# Patient Record
Sex: FEMALE | Race: WHITE | Employment: UNEMPLOYED | URBAN - METROPOLITAN AREA
[De-identification: names, ages, dates, MRNs, and addresses within clinical notes are randomized per-mention and may not be internally consistent; named-entity substitution may affect disease eponyms.]

---

## 2017-05-02 ENCOUNTER — HOSPITAL ENCOUNTER (OUTPATIENT)
Dept: MRI IMAGING | Age: 57
Discharge: HOME | End: 2017-05-02
Attending: ORTHOPAEDIC SURGERY
Payer: MEDICARE

## 2017-05-02 DIAGNOSIS — M40.209 ACQUIRED KYPHOSIS: ICD-10-CM

## 2017-05-02 DIAGNOSIS — M54.12 CERVICAL RADICULOPATHY: ICD-10-CM

## 2017-05-02 PROCEDURE — 72141 MRI NECK SPINE W/O DYE: CPT

## 2017-11-02 ENCOUNTER — HOSPITAL ENCOUNTER (OUTPATIENT)
Dept: MRI IMAGING | Age: 57
Discharge: HOME | End: 2017-11-02
Attending: PHYSICAL MEDICINE & REHABILITATION
Payer: MEDICARE

## 2017-11-02 DIAGNOSIS — M51.26 OTHER INTERVERTEBRAL DISC DISPLACEMENT, LUMBAR REGION: ICD-10-CM

## 2017-11-02 PROCEDURE — 72148 MRI LUMBAR SPINE W/O DYE: CPT

## 2018-02-16 ENCOUNTER — HOSPITAL ENCOUNTER (EMERGENCY)
Age: 58
Discharge: HOME | End: 2018-02-16
Attending: EMERGENCY MEDICINE
Payer: MEDICARE

## 2018-02-16 VITALS
DIASTOLIC BLOOD PRESSURE: 79 MMHG | TEMPERATURE: 97.6 F | HEART RATE: 90 BPM | WEIGHT: 160.94 LBS | HEIGHT: 64 IN | RESPIRATION RATE: 18 BRPM | SYSTOLIC BLOOD PRESSURE: 119 MMHG | BODY MASS INDEX: 27.48 KG/M2 | OXYGEN SATURATION: 98 %

## 2018-02-16 DIAGNOSIS — T14.8XXA ANIMAL BITE: Primary | ICD-10-CM
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
## 2018-02-16 DIAGNOSIS — Z23 NEED FOR IMMUNIZATION AGAINST RABIES: ICD-10-CM

## 2018-02-16 PROCEDURE — 90375 RABIES IG IM/SC: CPT | Performed by: PHYSICIAN ASSISTANT

## 2018-02-16 PROCEDURE — 99282 EMERGENCY DEPT VISIT SF MDM: CPT

## 2018-02-16 PROCEDURE — 90675 RABIES VACCINE IM: CPT | Performed by: PHYSICIAN ASSISTANT

## 2018-02-16 PROCEDURE — 90471 IMMUNIZATION ADMIN: CPT

## 2018-02-16 PROCEDURE — 96372 THER/PROPH/DIAG INJ SC/IM: CPT

## 2018-02-16 PROCEDURE — 74011250636 HC RX REV CODE- 250/636: Performed by: PHYSICIAN ASSISTANT

## 2018-02-16 RX ORDER — AMOXICILLIN AND CLAVULANATE POTASSIUM 875; 125 MG/1; MG/1
1 TABLET, FILM COATED ORAL 2 TIMES DAILY
Qty: 14 TAB | Refills: 0 | Status: SHIPPED | OUTPATIENT
Start: 2018-02-16 | End: 2018-03-06 | Stop reason: ALTCHOICE

## 2018-02-16 RX ADMIN — RABIES IMMUNE GLOBULIN (HUMAN) 1500 UNITS: 150 INJECTION INTRAMUSCULAR at 14:22

## 2018-02-16 RX ADMIN — RABIES VACCINE 2.5 UNITS: KIT at 14:22

## 2018-02-16 NOTE — ED PROVIDER NOTES
EMERGENCY DEPARTMENT HISTORY AND PHYSICAL EXAM      Date: 2/16/2018  Patient Name: Leeann Zelaya    History of Presenting Illness     Chief Complaint   Patient presents with    Animal Bite     bit by a stray cat right forearm last night     History Provided By: Patient    HPI: Leeann Zelaya, 65 y.o. female with PMHx significant for HTN, DM, arrhythmia, arthritis, and anxiety, presents ambulatory to the ED with concern following a sudden onset of an animal bite last night. Per pt, she was bitten by a stray feral cat around 19:30 last night. Pt informs the cat came up to her and was petting her when the cat bit her to the right forearm. Pt informs there is a mild discomfort to the region with an associated sore, aching sensation. Pt states she has cleaned the region with soap, water, and hydroxide. Pt reports the cat has been there in the neighborhood recurrently and she pets the cat often. Pt reports she called her PCP's office where she was advised to visit the ED for a rabies vaccine. Pt states since the cat is a stray cat, she is unsure of the animal's immunization condition. Pt expresses concern for the vaccine and potential infection. Pt reports no animal control intervention. Pt reports no other symptoms or modifying factors. Pt specifically denies any nausea, vomiting, fevers, chills, headaches, chest pain, or SOB. PMHx: DDD, kidney stones, and SVT  PSHx: cholecystectomy, tubal ligation, hysterectomy   Social Hx: + EtOH; - Smoker; - Illicit Drugs    PCP: Chapincito Kern MD    There are no other complaints, changes, or physical findings at this time. Current Outpatient Prescriptions   Medication Sig Dispense Refill    amoxicillin-clavulanate (AUGMENTIN) 875-125 mg per tablet Take 1 Tab by mouth two (2) times a day. 14 Tab 0    HYDROCODONE/IBUPROFEN (VICOPROFEN PO) Take 1 Tab by mouth as needed.       cyclobenzaprine (FLEXERIL) 10 mg tablet Take 10 mg by mouth three (3) times daily as needed for Muscle Spasm(s).  MECLIZINE HCL (MECLIZINE PO) Take  by mouth as needed.  promethazine (PHENERGAN) 25 mg tablet Take 25 mg by mouth every six (6) hours as needed for Nausea.  VITAMIN E ACETATE (VITAMIN E PO) Take 1 Tab by mouth daily.  ibuprofen (MOTRIN) 200 mg tablet Take 800 mg by mouth as needed for Pain.  7-OXODEHYDROEPIANDROSTERONE (7-KETO DHEA) 1 Tab by Does Not Apply route every morning.  metFORMIN (GLUCOPHAGE) 1,000 mg tablet Take 1,000 mg by mouth two (2) times daily (with meals).  BIEST / PROGESTERONE Apply 1 Tab to affected area daily. BiEst 1.5 mg / Progesterone 300 mg      levothyroxine (LEVOTHROID) 75 mcg tablet Take 75 mcg by mouth Daily (before breakfast).  PRASTERONE, DHEA, (DHEA PO) Take 5 mg by mouth daily.  magnesium oxide (MAG-OX) 400 mg tablet Take 400 mg by mouth daily.  cholecalciferol, vitamin D3, (VITAMIN D3) 2,000 unit tab Take 1 Tab by mouth daily.  ALPRAZolam (XANAX) 0.5 mg tablet Take 0.5 mg by mouth as needed for Anxiety.  TESTOSTERONE CREAM Apply  to affected area. Testosterone Cream       thyroid, Pork, (ARMOUR THYROID) 60 mg tablet Take 60 mg by mouth daily.  Indications: HYPOTHYROIDISM       Past History     Past Medical History:  Past Medical History:   Diagnosis Date    Anxiety     Arrhythmia     Arthritis     Bulging discs     in neck    Cataracts, bilateral     DDD (degenerative disc disease)     Diabetes ([address]     Endocrine disease     hypothyroid    Fainting     Gastroparesis     GERD (gastroesophageal reflux disease)     Hypertension     not treated with medication    Migraines     Nausea & vomiting     Other ill-defined conditions     Kidney stones    Other ill-defined conditions     fibromyalgia    SVT (supraventricular tachycardia) ([address]     Thyroid disease     Vasovagal syncope      Past Surgical History:  Past Surgical History:   Procedure Laterality Date    BREAST SURGERY PROCEDURE UNLISTED      breast reduction    CARDIAC SURG PROCEDURE UNLIST  2006    ablation    HX CHOLECYSTECTOMY      HX HYSTERECTOMY      HX SVT ABLATION  2006    [facility]    HX TUBAL LIGATION      HX UROLOGICAL      kidney stone removed.  HX UROLOGICAL      urethral surgery  x2     Family History:  Family History   Problem Relation Age of Onset   Yesy Juarez Cancer Mother      ovarian    Cancer Father      lung    Other Father      hepatitis b    Heart Disease Father     Lung Disease Father     Stroke Father     Thyroid Disease Sister      hypothyroid    Hypertension Sister     Anesth Problems Neg Hx     Thyroid Disease Sister      hypothyroid    Other Sister      benign brain tumor     Social History:  Social History   Substance Use Topics    Smoking status: Former Smoker     Years: 4.00     Quit date: 2/17/1984    Smokeless tobacco: Never Used    Alcohol use Yes      Comment: wine rare     Allergies: Allergies   Allergen Reactions    Sulfa (Sulfonamide Antibiotics) Rash    Tape [Adhesive] Rash     Review of Systems   Review of Systems   Constitutional: Negative for chills and fever. HENT: Negative for sore throat. Eyes: Negative for pain. Respiratory: Negative for cough and shortness of breath. Cardiovascular: Negative for chest pain. Gastrointestinal: Negative for abdominal pain, diarrhea, nausea and vomiting. Genitourinary: Negative for dysuria and hematuria. Musculoskeletal: Negative for arthralgias and myalgias. Skin: Positive for wound (+ right forearm ). Negative for rash. Neurological: Negative for dizziness, light-headedness, numbness and headaches. Psychiatric/Behavioral: Negative for behavioral problems and confusion. Physical Exam   Physical Exam   Constitutional: She is oriented to person, place, and time. She appears well-developed and well-nourished. No distress. HENT:   Head: Normocephalic and atraumatic.    Right Ear: External ear normal.   Left Ear: External ear normal.   Nose: Nose normal.   Eyes: Conjunctivae and EOM are normal.   Neck: Normal range of motion. Neck supple. Cardiovascular: Normal rate, regular rhythm and normal heart sounds. No murmur heard. Pulmonary/Chest: Effort normal and breath sounds normal. She has no decreased breath sounds. She has no wheezes. Abdominal: Soft. Bowel sounds are normal. She exhibits no distension. There is no tenderness. There is no guarding. Musculoskeletal: Normal range of motion. She exhibits no edema or tenderness. Neurological: She is alert and oriented to person, place, and time. Skin: Skin is warm and dry. No rash noted. She is not diaphoretic. Right anterior forearm with 2 by 2 inch area of ecchymosis with five puncture wounds; small amount of surrounding erythema with TTP; no fluctuance or induration, full ROM, 5/5  strength, 2+ radial pulse, no signs of symptoms of cellulitis    Psychiatric: She has a normal mood and affect. Her behavior is normal. Judgment normal.   Nursing note and vitals reviewed. Medical Decision Making   I am the first provider for this patient. I reviewed the vital signs, available nursing notes, past medical history, past surgical history, family history and social history. Vital Signs-Reviewed the patient's vital signs. Patient Vitals for the past 12 hrs:   Temp Pulse Resp BP SpO2   02/16/18 1209 97.6 °F (36.4 °C) 90 18 119/79 98 %     Pulse Oximetry Analysis - 98% on RA    Cardiac Monitor:   Rate: 90 bpm  Rhythm: Normal Sinus Rhythm      Records Reviewed: Nursing Notes and Old Medical Records    Provider Notes (Medical Decision Making):   DDx: animal bite, cellulitis, abscess, need for rabies prophylaxis     Pt presents s/p cat bite and scratch last night; cat is not contained with no animal control involvement. Pt unsure of cat's rabies status and would like to be prophylactically treated. ED Course:   Initial assessment performed.  The patients presenting problems have been discussed, and they are in agreement with the care plan formulated and outlined with them. I have encouraged them to ask questions as they arise throughout their visit. Procedure Note - Rabies IgG injection:    2:26 PM  Performed by Tiarra Mendoza PA-C. A local injection was performed on the R forearm. Area was prepped with an alcohol prep pad. 3 ccs of Rabies Immune Globulin was injected. Total time at bedside, performing this procedure was 5 minutes. The procedure was tolerated well without any complications. An ace wrap was applied to the area by the nurse directly after the procedure. This note is prepared by Emma Long, acting as Scribe for Jules Díaz PA-C. Disposition:  DISCHARGE NOTE:    2:25 PM  The patient is ready for discharge. The patient signs, symptoms, diagnosis, and discharge instructions have been discussed and the patient has conveyed their understanding. The patient is to follow-up as reccommended or returned to the ER should their symptoms worsen. Plan has been discussed and patient is in agreement. PLAN:  1. Discharge home  2. Medications as directed  3. Return to ED in 3 days for second rabies vaccination  4. Return precautions reviewed    Discharge Medication List as of 2/16/2018  2:34 PM      START taking these medications    Details   amoxicillin-clavulanate (AUGMENTIN) 875-125 mg per tablet Take 1 Tab by mouth two (2) times a day., Normal, Disp-14 Tab, R-0         CONTINUE these medications which have NOT CHANGED    Details   HYDROCODONE/IBUPROFEN (VICOPROFEN PO) Take 1 Tab by mouth as needed., Historical Med      cyclobenzaprine (FLEXERIL) 10 mg tablet Take 10 mg by mouth three (3) times daily as needed for Muscle Spasm(s). , Historical Med      MECLIZINE HCL (MECLIZINE PO) Take  by mouth as needed., Historical Med      promethazine (PHENERGAN) 25 mg tablet Take 25 mg by mouth every six (6) hours as needed for Nausea., Historical Med      VITAMIN E ACETATE (VITAMIN E PO) Take 1 Tab by mouth daily. , Historical Med      ibuprofen (MOTRIN) 200 mg tablet Take 800 mg by mouth as needed for Pain., Historical Med      7-OXODEHYDROEPIANDROSTERONE (7-KETO DHEA) 1 Tab by Does Not Apply route every morning., Historical Med      metFORMIN (GLUCOPHAGE) 1,000 mg tablet Take 1,000 mg by mouth two (2) times daily (with meals). , Historical Med      BIEST / PROGESTERONE Apply 1 Tab to affected area daily. BiEst 1.5 mg / Progesterone 300 mg, Historical Med      levothyroxine (LEVOTHROID) 75 mcg tablet Take 75 mcg by mouth Daily (before breakfast). , Historical Med      PRASTERONE, DHEA, (DHEA PO) Take 5 mg by mouth daily. , Historical Med      magnesium oxide (MAG-OX) 400 mg tablet Take 400 mg by mouth daily. , Historical Med      cholecalciferol, vitamin D3, (VITAMIN D3) 2,000 unit tab Take 1 Tab by mouth daily. , Historical Med      ALPRAZolam (XANAX) 0.5 mg tablet Take 0.5 mg by mouth as needed for Anxiety. , Historical Med      TESTOSTERONE CREAM Apply  to affected area. Testosterone Cream TESTOSTERONE CREAM [facility]:[id number] Historical Med      thyroid, Pork, (ARMOUR THYROID) 60 mg tablet Take 60 mg by mouth daily. Indications: HYPOTHYROIDISM, Historical Med           2. Follow-up Information     Follow up With Details Comments Contact Info    [facility] EMERGENCY DEPT  For scheduled rabies f/u injections [address] [place] [address]  [phone]        Return to ED if worse     Diagnosis     Clinical Impression:   1. Animal bite    2. Cat scratch    3. Need for immunization against rabies      Attestations: This note is prepared by Fran Izaguirre, acting as Scribe for Keren Alonso PA-C. Keren Alonso PA-C: The scribe's documentation has been prepared under my direction and personally reviewed by me in its entirety. I confirm that the note above accurately reflects all work, treatment, procedures, and medical decision making performed by me.

## 2018-02-16 NOTE — DISCHARGE INSTRUCTIONS
Thank you for allowing us to provide you with excellent care today. We hope we addressed all of your concerns and needs. We strive to provide excellent quality care in the Emergency Department. Please rate us as excellent, as anything less than excellent does not meet our expectations. If you feel that you have not received excellent quality care or timely care, please ask to speak to the nurse manager. Please choose us in the future for your continued health care needs. The exam and treatment you received in the Emergency Department were for an urgent problem and are not intended as complete care. It is important that you follow-up with a doctor, nurse practitioner, or physician assistant to:  (1) confirm your diagnosis,  (2) re-evaluation of changes in your illness and treatment, and  (3) for ongoing care. If your symptoms become worse or you do not improve as expected and you are unable to reach your usual health care provider, you should return to the Emergency Department. We are available 24 hours a day. Take this sheet with you when you go to your follow-up visit. If you have any problem arranging the follow-up visit, contact [address] [place] [address] 21 [phone])    Make an appointment with your Primary Care doctor for follow up of this visit. Return to the ER if you are unable to be seen in the time recommended on your discharge instructions. Rabies Vaccine: What You Need to Know  What is rabies? Rabies is a serious disease. It is caused by a virus. Rabies is mainly a disease of animals. Humans get rabies when they are bitten by infected animals. At first there might not be any symptoms. But weeks, or even months after a bite, rabies can cause pain, fatigue, headaches, fever, and irritability. These are followed by seizures, hallucinations, and paralysis. Human rabies is almost always fatal.  Wild animals-especially bats-are the most common source of human rabies infection in the [place].   Skunks, raccoons, dogs, cats, coyotes, foxes and other mammals can also transmit the disease. Human rabies is rare in the [place]. There have been only 54 cases diagnosed since 1990. However, between 16,000 and 39,000 people are vaccinated each year as a precaution after animal bites. Also, rabies is far more common in other parts of the world, with about 40,000 -70,000 rabies-related deaths worldwide each year. Bites from unvaccinated dogs cause most of these cases. Rabies vaccine can prevent rabies. Rabies vaccine  Rabies vaccine is given to people at high risk of rabies to protect them if they are exposed. It can also prevent the disease if it is given to a person after they have been exposed. Rabies vaccine is made from killed rabies virus. It cannot cause rabies. Who should get rabies vaccine and when? Preventive vaccination (no exposure)  · People at high risk of exposure to rabies, such as veterinarians, animal handlers, rabies laboratory workers, spelunkers, and rabies biologics production workers should be offered rabies vaccine. · The vaccine should also be considered for:  ¨ People whose activities bring them into frequent contact with rabies virus or with possibly rabid animals. ¨ International travelers who are likely to come in contact with animals in parts of the world where rabies is common. · The pre-exposure schedule for rabies vaccination is 3 doses, given at the following times:  ¨ Dose 1: As appropriate  ¨ Dose 2: 7 days after Dose 1  ¨ Dose 3: 21 days or 28 days after Dose 1  For laboratory workers and others who may be repeatedly exposed to rabies virus, periodic testing for immunity is recommended, and booster doses should be given as needed. (Testing or booster doses are not recommended for travelers.) Ask your doctor for details.   Vaccination After an Exposure  Anyone who has been bitten by an animal, or who otherwise may have been exposed to rabies, should clean the wound and see a doctor immediately. The doctor will determine if they need to be vaccinated. A person who is exposed and has never been vaccinated against rabies should get 4 doses of rabies vaccine-one dose right away, and additional doses on the 3rd, 7th, and 14th days. They should also get another shot called Rabies Immune Globulin at the same time as the first dose. A person who has been previously vaccinated should get 2 doses of rabies vaccine-one right away and another on the 3rd day. Rabies Immune Globulin is not needed. Tell your doctor if . . .  Talk with a doctor before getting rabies vaccine if you:  1. Ever had a serious (life-threatening) allergic reaction to a previous dose of rabies vaccine, or to any component of the vaccine; tell your doctor if you have any severe allergies. 2. Have a weakened immune system because of:  ¨ HIV/AIDS or another disease that affects the immune system. ¨ Treatment with drugs that affect the immune system, such as steroids. ¨ Cancer, or cancer treatment with radiation or drugs. If you have a minor illnesses, such as a cold, you can be vaccinated. If you are moderately or severely ill, you should probably wait until you recover before getting a routine (non-exposure) dose of rabies vaccine. If you have been exposed to rabies virus, you should get the vaccine regardless of any other illnesses you may have. What are the risks from rabies vaccine? A vaccine, like any medicine, is capable of causing serious problems, such as severe allergic reactions. The risk of a vaccine causing serious harm, or death, is extremely small. Serious problems from rabies vaccine are very rare.   Mild problems  · Soreness, redness, swelling, or itching where the shot was given (30%-74%)  · Headache, nausea, abdominal pain, muscle aches, dizziness (5%-40%)  Moderate problems  · Hives, pain in the joints, fever (about 6% of booster doses)  Other nervous system disorders, such as Guillain Barré syndrome (GBS), have been reported after rabies vaccine, but this happens so rarely that it is not known whether they are related to the vaccine. NOTE: Several brands of rabies vaccine are available in the [place], and reactions may vary between brands. Your provider can give you more information about a particular brand. What if there is a serious reaction? What should I look for? · Look for anything that concerns you, such as signs of a severe allergic reaction, very high fever, or behavior changes. Signs of a severe allergic reaction can include hives, swelling of the face and throat, difficulty breathing, a fast heartbeat, dizziness, and weakness. These would start a few minutes to a few hours after the vaccination. What should I do? · If you think it is a severe allergic reaction or other emergency that can't wait, call 9-1-1 or get the person to the nearest hospital. Otherwise, call your doctor. · Afterward, the reaction should be reported to the Vaccine Adverse Event Reporting System (VAERS). Your doctor might file this report, or you can do it yourself through the VAERS web site at www.vaers. [employer].gov, or by calling 1-702.134.9710. VAERS is only for reporting reactions. They do not give medical advice. How can I learn more? · Ask your doctor. · Call your local or state health department. · Contact the Centers for Disease Control and Prevention (CDC):  ¨ Visit CDC's rabies website at www.cdc.gov/rabies/  Vaccine Information Statement  Rabies Vaccine  (10/6/2009)  Department of Health and Human Services  Centers for Disease Control and Prevention  Many Vaccine Information Statements are available in [place] and other languages. See www.immunize.org/vis. Hojas de Informacián Sobre Vacunas están disponibles en Español y en muchos otros idiomas. Visite Pieter.si.   Care instructions adapted under license by [employer] (which disclaims liability or warranty for this information). If you have questions about a medical condition or this instruction, always ask your healthcare professional. [address] any warranty or liability for your use of this information. Preventing Rabies Infection: Care Instructions  Your Care Instructions    Rabies is a disease caused by a virus that can affect the brain and nervous system. You can get rabies when you are exposed to an animal that has rabies. This can happen through a bite, scratch, or other contact. Your doctor can use two medicines to help your body fight the virus before it causes an infection. One is rabies immunoglobulin. It works by giving your body a type of protein called an antibody to stop the rabies virus. The other medicine is the rabies vaccine. It helps your body produce its own protection against the rabies virus. When you get these shots before serious symptoms appear, you should not get infected with rabies. Your doctor will give you a shot schedule. Make sure that you do not miss any doses. You need to get all the doses for the rabies vaccine to work. If you are exposed and have not been vaccinated against rabies, you should get 4 doses of rabies vaccine. · Get 1 dose right away. You should also get a rabies immunoglobulin shot when you get the first dose. · Get more doses on the 3rd, 7th, and 14th days. If you're exposed and have been vaccinated before, you should get 2 doses of rabies vaccine. · Get 1 dose right away. In this case, you do not need rabies immunoglobulin. · Get another on the 3rd day. You might also get a shot to prevent rabies if you handle animals often. Or you may get one if you plan to travel to places where rabies is a risk. Follow-up care is a key part of your treatment and safety. Be sure to make and go to all appointments, and call your doctor if you are having problems.  It's also a good idea to know your test results and keep a list of the medicines you take.  How can you care for yourself at home? · Do not drive or use machines if the rabies vaccine makes you dizzy. · Both types of rabies shots may cause fever. And both may cause pain or stiffness where you got the shot. If your doctor recommends it, take an over-the-counter pain medicine, such as acetaminophen (Tylenol), ibuprofen (Advil, Motrin), or naproxen (Aleve), as needed. Read and follow all instructions on the label. · Do not take two or more pain medicines at the same time unless the doctor told you to. Many pain medicines have acetaminophen, which is Tylenol. Too much acetaminophen (Tylenol) can be harmful. To prevent contact with rabies  · Make sure your dog, cat, or ferret gets the rabies vaccine. · Avoid all contact with bats. · Never touch or try to pet or catch wild animals. This includes raccoons, skunks, foxes, and coyotes. · Secure trash and other items that attract animals. · Secure open areas of your home. Close off pet doors, chimneys, unscreened windows, or any place that wild or stray animals could get in. · Never handle a dead or wounded animal.  To take care of an animal bite  · Wash any animal bite or area of exposure right away. Use soap and water. · Call your doctor to find out how to care for your wound. · If the animal is a dog, cat, or pet ferret, try to find and contact the owner. If you can't find the owner, call the local animal control to safely catch the animal.  · If the animal is wild, do not try to catch or kill it. Find out what type of animal it is. Note whether it is acting normal. Report it to the local animal control. · Contact the local or state health department to report a bite or a severe scratch from an animal.  · Ask your doctor if you need a tetanus shot. When should you call for help? Watch closely for changes in your health, and be sure to contact your doctor if you have any problems. Where can you learn more?   Go to http://blayne-wali.info/. Enter U105 in the search box to learn more about \"Preventing Rabies Infection: Care Instructions. \"  Current as of: March 3, 2017  Content Version: 11.4  © 9553-0154 Healthwise, [employer]. Care instructions adapted under license by [employer] (which disclaims liability or warranty for this information). If you have questions about a medical condition or this instruction, always ask your healthcare professional. [place] [address] any warranty or liability for your use of this information.

## 2018-02-16 NOTE — ED NOTES
Patient evaluated in [facility]. Patient alert and oriented;  C/o cat bite to right lower arm by stray cat.  PA bedside for exam.

## 2018-02-19 ENCOUNTER — HOSPITAL ENCOUNTER (EMERGENCY)
Age: 58
Discharge: HOME | End: 2018-02-19
Attending: EMERGENCY MEDICINE
Payer: MEDICARE

## 2018-02-19 DIAGNOSIS — Z23 NEED FOR RABIES VACCINATION: Primary | ICD-10-CM

## 2018-02-19 PROCEDURE — 90471 IMMUNIZATION ADMIN: CPT

## 2018-02-19 PROCEDURE — 90675 RABIES VACCINE IM: CPT | Performed by: EMERGENCY MEDICINE

## 2018-02-19 PROCEDURE — 74011250636 HC RX REV CODE- 250/636: Performed by: EMERGENCY MEDICINE

## 2018-02-19 PROCEDURE — 99281 EMR DPT VST MAYX REQ PHY/QHP: CPT

## 2018-02-19 RX ADMIN — Medication 2.5 UNITS: at 06:54

## 2018-02-19 NOTE — ED NOTES
Dr. Avelina Estrada reviewed discharge instructions with the patient. The patient verbalized understanding. All questions and concerns were addressed. The patient declined a wheelchair and is discharged ambulatory in the care of family members with instructions and prescriptions in hand. Pt is alert and oriented x 4. Respirations are clear and unlabored.

## 2018-02-19 NOTE — ED PROVIDER NOTES
EMERGENCY DEPARTMENT HISTORY AND PHYSICAL EXAM      Date: (Not on file)  Patient Name: Umu Wells    History of Presenting Illness     Chief Complaint   Patient presents with    Immunization/Injection     here for 2nd rabies vaccine, pt seen on friday for first set. History Provided By: Patient and medical records    HPI: Umu Wells, 65 y.o. female with PMHx significant for HTN and DM, presents ambulatory to the ED for her 2nd rabies vaccination. Per medical records, the patient sustained a cat bite to her right forearm on 2/15/2018. She reports returning to the ED four days ago for her 1st set of rabies vaccination, and she denies any complications with her 1st set. She also denies any complications with her wound at this time. She notes returning to the ED in 5 days. She specifically denies all other complaints at this time. Given the pt is currently asymptomatic, there is no applicable location, quality, duration, severity, timing, context, associated sxs, additional context, or modifying factors. There are no other complaints, changes, or physical findings at this time. PCP: Marifer Soler MD    Current medications reviewed.      Past History     Past Medical History:  Past Medical History:   Diagnosis Date    Anxiety     Arrhythmia     Arthritis     Bulging discs     in neck    Cataracts, bilateral     DDD (degenerative disc disease)     Diabetes ([address]     Endocrine disease     hypothyroid    Fainting     Gastroparesis     GERD (gastroesophageal reflux disease)     Hypertension     not treated with medication    Migraines     Nausea & vomiting     Other ill-defined conditions(799.89)     Kidney stones    Other ill-defined conditions(799.89)     fibromyalgia    SVT (supraventricular tachycardia) ([address]     Thyroid disease     Vasovagal syncope        Past Surgical History:  Past Surgical History:   Procedure Laterality Date    BREAST SURGERY PROCEDURE UNLISTED breast reduction    CARDIAC SURG PROCEDURE UNLIST  2006    ablation    HX CHOLECYSTECTOMY      HX HYSTERECTOMY      HX SVT ABLATION  2006    Smith    HX TUBAL LIGATION      HX UROLOGICAL      kidney stone removed.  HX UROLOGICAL      urethral surgery  x2       Family History:  Family History   Problem Relation Age of Onset   [facility] Cancer Mother      ovarian    Cancer Father      lung    Other Father      hepatitis b    Heart Disease Father     Lung Disease Father     Stroke Father     Thyroid Disease Sister      hypothyroid    Hypertension Sister     Thyroid Disease Sister      hypothyroid    Other Sister      benign brain tumor    Anesth Problems Neg Hx        Social History:  Social History   Substance Use Topics    Smoking status: Former Smoker     Years: 4.00     Quit date: 2/17/1984    Smokeless tobacco: Never Used    Alcohol use Yes      Comment: wine rare       Allergies: Allergies   Allergen Reactions    Sulfa (Sulfonamide Antibiotics) Rash    Tape [Adhesive] Rash       Review of Systems   Review of Systems   Constitutional: Negative. Negative for appetite change, chills, fatigue and fever. HENT: Negative. Negative for congestion, rhinorrhea, sinus pressure and sore throat. Eyes: Negative. Respiratory: Negative. Negative for cough, choking, chest tightness, shortness of breath and wheezing. Cardiovascular: Negative. Negative for chest pain, palpitations and leg swelling. Musculoskeletal: Negative. Skin:        Cat bite right forearm   All other systems reviewed and are negative. Physical Exam   Physical Exam   Constitutional: She is oriented to person, place, and time. She appears well-developed and well-nourished. No distress. HENT:   Head: Normocephalic and atraumatic. Mouth/Throat: Oropharynx is clear and moist.   Eyes: Conjunctivae and EOM are normal. Pupils are equal, round, and reactive to light. Neck: Normal range of motion. Neck supple. Cardiovascular: Normal rate. Pulmonary/Chest: Effort normal.   Musculoskeletal:   Wound to right forearm no swelling, erythema, of lymphangitic streak, ecchymosis present- brown/purple in color   Neurological: She is alert and oriented to person, place, and time. No cranial nerve deficit. No gross motor or sensory deficits    Skin: Skin is warm and dry. She is not diaphoretic. Psychiatric: She has a normal mood and affect. Her behavior is normal.   Nursing note and vitals reviewed. Medical Decision Making   I am the first provider for this patient. I reviewed the vital signs, available nursing notes, past medical history, past surgical history, family history and social history. Vital Signs-Reviewed the patient's vital signs. No data found. Records Reviewed: Nursing Notes and Old Medical Records    Provider Notes (Medical Decision Making):   DDx: Rabies vaccination update. ED Course:   Initial assessment performed. The patients presenting problems have been discussed, and they are in agreement with the care plan formulated and outlined with them. I have encouraged them to ask questions as they arise throughout their visit. Critical Care Time: 0 minutes    Discharge Note:  7:07 AM  The pt is ready for discharge. The pt's signs, symptoms, diagnosis, and discharge instructions have been discussed and pt has conveyed their understanding. The pt is to follow up as recommended or return to ER should their symptoms worsen. Plan has been discussed and pt is in agreement. PLAN:  1. Current Discharge Medication List        2. Follow-up Information     Follow up With Details Comments [facility]  [phone]      [facility] EMERGENCY DEPT  Feb 23 for third vaccine [address] [place] [address] Drive  [address] [facility]  [phone]        Return to ED if worse     Diagnosis     Clinical Impression:   1.  Need for rabies vaccination Attestations: This note is prepared by Justine Calderon, acting as a Scribe for Jose Chavez, [address], DO: The scribe's documentation has been prepared under my direction and personally reviewed by me in its entirety. I confirm that the notes above accurately reflects all work, treatment, procedures, and medical decision making performed by me. This note will not be viewable in [address]

## 2018-02-23 ENCOUNTER — HOSPITAL ENCOUNTER (EMERGENCY)
Age: 58
Discharge: HOME | End: 2018-02-23
Attending: EMERGENCY MEDICINE
Payer: MEDICARE

## 2018-02-23 VITALS
RESPIRATION RATE: 14 BRPM | DIASTOLIC BLOOD PRESSURE: 79 MMHG | SYSTOLIC BLOOD PRESSURE: 120 MMHG | HEIGHT: 64 IN | TEMPERATURE: 97.7 F | WEIGHT: 161.16 LBS | HEART RATE: 75 BPM | BODY MASS INDEX: 27.51 KG/M2 | OXYGEN SATURATION: 97 %

## 2018-02-23 DIAGNOSIS — R11.0 CHRONIC NAUSEA: ICD-10-CM

## 2018-02-23 DIAGNOSIS — Z23 NEED FOR RABIES VACCINATION: Primary | ICD-10-CM

## 2018-02-23 DIAGNOSIS — Z51.89 VISIT FOR WOUND CHECK: ICD-10-CM

## 2018-02-23 PROCEDURE — 99283 EMERGENCY DEPT VISIT LOW MDM: CPT

## 2018-02-23 PROCEDURE — 90471 IMMUNIZATION ADMIN: CPT

## 2018-02-23 PROCEDURE — 74011250636 HC RX REV CODE- 250/636: Performed by: PHYSICIAN ASSISTANT

## 2018-02-23 PROCEDURE — 74011250637 HC RX REV CODE- 250/637: Performed by: PHYSICIAN ASSISTANT

## 2018-02-23 PROCEDURE — 90675 RABIES VACCINE IM: CPT | Performed by: PHYSICIAN ASSISTANT

## 2018-02-23 RX ORDER — ONDANSETRON 4 MG/1
4 TABLET, ORALLY DISINTEGRATING ORAL
Status: COMPLETED | OUTPATIENT
Start: 2018-02-23 | End: 2018-02-23

## 2018-02-23 RX ORDER — ONDANSETRON 4 MG/1
4 TABLET, ORALLY DISINTEGRATING ORAL
Qty: 15 TAB | Refills: 0 | Status: SHIPPED | OUTPATIENT
Start: 2018-02-23

## 2018-02-23 RX ADMIN — ONDANSETRON 4 MG: 4 TABLET, ORALLY DISINTEGRATING ORAL at 08:44

## 2018-02-23 RX ADMIN — RABIES VACCINE 2.5 UNITS: KIT at 08:44

## 2018-02-23 NOTE — DISCHARGE INSTRUCTIONS
DTaP (Diphtheria, Tetanus, Pertussis) Vaccine: What You Need to Know  Why get vaccinated? Diphtheria, tetanus, and pertussis are serious diseases caused by bacteria. Diphtheria and pertussis are spread from person to person. Tetanus enters the body through cuts or wounds. DIPHTHERIA causes a thick covering in the back of the throat. · It can lead to breathing problems, paralysis, heart failure, and even death. TETANUS (Lockjaw) causes painful tightening of the muscles, usually all over the body. · It can lead to \"locking\" of the jaw so the victim cannot open his mouth or swallow. Tetanus leads to death in up to 2 out of 10 cases. PERTUSSIS (Whooping Cough) causes coughing spells so bad that it is hard for infants to eat, drink, or breathe. These spells can last for weeks. · It can lead to pneumonia, seizures (jerking and staring spells), brain damage, and death. Diphtheria, tetanus, and pertussis vaccine (DTaP) can help prevent these diseases. Most children who are vaccinated with DTaP will be protected throughout childhood. Many more children would get these diseases if we stopped vaccinating. DTaP is a safer version of an older vaccine called DTP. DTP is no longer used in the [place]. Who should get DTaP vaccine and when? Children should get 5 doses of DTaP vaccine, one dose at each of the following ages:  · 2 months  · 4 months  · 6 months  · 15-18 months  · 4-6 years  DTaP may be given at the same time as other vaccines. Some children should not get DTaP vaccine or should wait. · Children with minor illnesses, such as a cold, may be vaccinated. But children who are moderately or severely ill should usually wait until they recover before getting DTaP vaccine. · Any child who had a life-threatening allergic reaction after a dose of DTaP should not get another dose.   · Any child who suffered a brain or nervous system disease within 7 days after a dose of DTaP should not get another dose.  · Talk with your doctor if your child:  Yefri Pryor Had a seizure or collapsed after a dose of DTaP. ¨ Cried non-stop for 3 hours or more after a dose of DTaP. ¨ Had a fever over 105°F after a dose of DTaP. Ask your doctor for more information. Some of these children should not get another dose of pertussis vaccine, but may get a vaccine without pertussis, called DT. Older children and adults  DTaP is not licensed for adolescents, adults, or children 9years of age and older. But older people still need protection. A vaccine called Tdap is similar to DTaP. A single dose of Tdap is recommended for people 11 through 59years of age. Another vaccine, called Td, protects against tetanus and diphtheria, but not pertussis. It is recommended every 10 years. There are separate Vaccine Information Statements for these vaccines. What are the risks from DTaP vaccine? Getting diphtheria, tetanus, or pertussis disease is much riskier than getting DTaP vaccine. However, a vaccine, like any medicine, is capable of causing serious problems, such as severe allergic reactions. The risk of DTaP vaccine causing serious harm, or death, is extremely small. Mild Problems (Common)  · Fever (up to about 1 child in 4)  · Redness or swelling where the shot was given (up to about 1 child in 4)  · Soreness or tenderness where the shot was given (up to about 1 child in 4)  These problems occur more often after the 4th and 5th doses of the DTaP series than after earlier doses. Sometimes the 4th or 5th dose of DTaP vaccine is followed by swelling of the entire arm or leg in which the shot was given, lasting 1-7 days (up to about 1 child in 27). Other mild problems include:  · Fussiness (up to about 1 child in 3)  · Tiredness or poor appetite (up to about 1 child in 10)  · Vomiting (up to about 1 child in 48)  These problems generally occur 1-3 days after the shot.   Moderate Problems (Uncommon)  · Seizure (jerking or staring) (about 1 child out of 14,000)  · Non-stop crying, for 3 hours or more (up to about 1 child out of 1,000)  · High fever, over 105°F (about 1 child out of 16,000)  Severe Problems (Very Rare)  · Serious allergic reaction (less than 1 out of a million doses)  · Several other severe problems have been reported after DTaP vaccine. These include:  ¨ Long-term seizures, coma, or lowered consciousness. ¨ Permanent brain damage. These are so rare it is hard to tell if they are caused by the vaccine. Controlling fever is especially important for children who have had seizures, for any reason. It is also important if another family member has had seizures. You can reduce fever and pain by giving your child an aspirin-free pain reliever when the shot is given, and for the next 24 hours, following the package instructions. What if there is a serious reaction? What should I look for? · Look for anything that concerns you, such as signs of a severe allergic reaction, very high fever, or behavior changes. Signs of a severe allergic reaction can include hives, swelling of the face and throat, difficulty breathing, a fast heartbeat, dizziness, and weakness. These would start a few minutes to a few hours after the vaccination. What should I do? · If you think it is a severe allergic reaction or other emergency that can't wait, call 9-1-1 or get the person to the nearest hospital. Otherwise, call your doctor. · Afterward, the reaction should be reported to the Vaccine Adverse Event Reporting System (VAERS). Your doctor might file this report, or you can do it yourself through the VAERS web site at www.vaers. hhs.gov, or by calling 6-424.678.7422. VAERS is only for reporting reactions. They do not give medical advice. The National Vaccine Injury Compensation Program  The National Vaccine Injury Compensation Program (VICP) is a federal program that was created to compensate people who may have been injured by certain vaccines.   Persons who believe they may have been injured by a vaccine can learn about the program and about filing a claim by calling 5-401.778.3011 or visiting the [address] [employer] website at www.[facility] Centera.gov/vaccinecompensation. How can I learn more? · Ask your doctor. · Call your local or state health department. · Contact the Centers for Disease Control and Prevention (CDC):  ¨ Call 8-631.366.8651 (1-800-CDC-INFO) or  ¨ Visit CDC's website at www.cdc.gov/vaccines  Vaccine Information Statement  DTaP (Tetanus, Diphtheria, Pertussis ) Vaccine  (5/17/2007)  [address] 604SA[address]41  Department of Health and Human Services  Centers for Disease Control and Prevention  Many Vaccine Information Statements are available in Swedish and other languages. See www.immunize.org/vis. Muchas hojas de información sobre vacunas están disponibles en español y en otros idiomas. Visite www.immunize.org/vis. Care instructions adapted under license by [employer] (which disclaims liability or warranty for this information). If you have questions about a medical condition or this instruction, always ask your healthcare professional. [place] [address] any warranty or liability for your use of this information.

## 2018-02-23 NOTE — ED PROVIDER NOTES
EMERGENCY DEPARTMENT HISTORY AND PHYSICAL EXAM      Date: 2/23/2018  Patient Name: Albert Skinner    History of Presenting Illness     Chief Complaint   Patient presents with    Animal Bite     Ambulatory into the ED for her 3rd rabies vaccine. Bitten by a stray cat last week. History Provided By: Patient    HPI: Albert Skinner, 65 y.o. female with PMHx significant for HTN, DM presents ambulatory to the ED for the third rabies vaccine (day 7). Pt reports a stray cat bit her R forearm on the night of 2/16/18. Per medical records, she was seen in the ED the next day upon the recommendation of her PCP and was given her first rabies vaccine and immune globin and px augmentin. Pt had a second rabies vaccine on 2/19/18. She denies having any complications at the injection sites. She denies any R forearm pain today. As such, there is no applicable quality, severity or modifying factors. Pt reports having mild nausea, but states it is a chronic problem related to vertigo. She states her current nausea medicine makes her somnolent. Pt denies any other sxs including fever, chills, congestion, rhinorrhea, sore throat, cough, SOB, CP, abdominal pain, N/V/D, dysuria, hematuria, dizziness, HA, and rash. Social hx: -(former) Tobacco, -EtOH, -Drugs    PCP: Deven Scruggs MD    There are no other complaints, changes, or physical findings at this time.         Past History     Past Medical History:  Past Medical History:   Diagnosis Date    Anxiety     Arrhythmia     Arthritis     Bulging discs     in neck    Cataracts, bilateral     DDD (degenerative disc disease)     Diabetes ([address]     Endocrine disease     hypothyroid    Fainting     Gastroparesis     GERD (gastroesophageal reflux disease)     Hypertension     not treated with medication    Migraines     Nausea & vomiting     Other ill-defined conditions(799.89)     Kidney stones    Other ill-defined conditions(799.89)     fibromyalgia    SVT [facility]ricular tachy[facility]) [facility]     Thyroid disease     Vasovagal syncope        Past Surgical History:  Past Surgical History:   Procedure Laterality Date    BREAST SURGERY PROCEDURE UNLISTED      breast reduction    CARDIAC SURG PROCEDURE UNLIST  2006    ablation    HX CHOLECYSTECTOMY      HX HYSTERECTOMY      HX SVT ABLATION  2006    [facility]    HX TUBAL LIGATION      HX UROLOGICAL      kidney stone removed.  HX UROLOGICAL      urethral surgery  x2       Family History:  Family History   Problem Relation Age of Onset   [facility] Mother      ovarian    Cancer Father      lung    Other Father      hepatitis b    Heart Disease Father     Lung Disease Father     Stroke Father     Thyroid Disease Sister      hypothyroid    Hypertension Sister     Thyroid Disease Sister      hypothyroid    Other Sister      benign brain tumor    Anesth Problems Neg Hx        Social History:  Social History   Substance Use Topics    Smoking status: Former Smoker     Years: 4.00     Quit date: 2/17/1984    Smokeless tobacco: Never Used    Alcohol use Yes      Comment: wine rare       Allergies: Allergies   Allergen Reactions    Sulfa (Sulfonamide Antibiotics) Rash    Tape [Adhesive] Rash         Review of Systems   Review of Systems   Constitutional: Negative for chills and fever. HENT: Negative for rhinorrhea and sore throat. Respiratory: Negative for cough and shortness of breath. Cardiovascular: Negative for chest pain and palpitations. Gastrointestinal: Negative for diarrhea, nausea and vomiting. Endocrine: Negative for polydipsia, polyphagia and polyuria. Musculoskeletal: Negative for neck pain and neck stiffness. Skin: Positive for wound (puncture wound to R  forearm). Negative for rash. Allergic/Immunologic: Positive for environmental allergies. Negative for food allergies and immunocompromised state. Neurological: Negative for dizziness, weakness, numbness and headaches. Psychiatric/Behavioral: Negative for agitation and confusion. The patient is not nervous/anxious. Physical Exam   Physical Exam   Constitutional: She is oriented to person, place, and time. She appears well-developed and well-nourished. No distress. HENT:   Head: Normocephalic and atraumatic. Nose: Nose normal.   Mouth/Throat: Oropharynx is clear and moist. No oropharyngeal exudate. Eyes: Conjunctivae and EOM are normal. Right eye exhibits no discharge. Left eye exhibits no discharge. No scleral icterus. Neck: Normal range of motion. Neck supple. No JVD present. No tracheal deviation present. No thyromegaly present. Cardiovascular: Normal rate, regular rhythm and normal heart sounds. Pulmonary/Chest: Effort normal and breath sounds normal. No respiratory distress. She has no wheezes. Abdominal: Soft. There is no tenderness. Musculoskeletal: Normal range of motion. She exhibits no edema. Lymphadenopathy:     She has no cervical adenopathy. Neurological: She is alert and oriented to person, place, and time. She exhibits normal muscle tone. Coordination normal.   Skin: Skin is warm and dry. She is not diaphoretic. Well healed punctrure wound to R forearm with associated ecchymosis. Psychiatric: She has a normal mood and affect. Her behavior is normal. Judgment normal.   Nursing note and vitals reviewed. Diagnostic Study Results     Labs -   No results found for this or any previous visit (from the past 12 hour(s)). Radiologic Studies -   No orders to display         Medical Decision Making   I am the first provider for this patient. I reviewed the vital signs, available nursing notes, past medical history, past surgical history, family history and social history. Vital Signs-Reviewed the patient's vital signs. Patient Vitals for the past 12 hrs:   Temp Pulse Resp BP SpO2   02/23/18 0836 97.7 °F (36.5 °C) 75 14 120/79 97 %       Records Reviewed:  Old Medical Records    Provider Notes (Medical Decision Making):   DDx: rabies post exposure prophylaxis    ED Course:   Initial assessment performed. The patients presenting problems have been discussed, and they are in agreement with the care plan formulated and outlined with them. I have encouraged them to ask questions as they arise throughout their visit. Critical Care Time:   0    Disposition:  DISCHARGE NOTE  9:01 AM   The patient has been re-evaluated and is ready for discharge. Reviewed available results with patient. Counseled pt on diagnosis and care plan. Pt has expressed understanding, and all questions have been answered. Pt agrees with plan and agrees to F/U as recommended, or return to the ED if their sxs worsen. Discharge instructions have been provided and explained to the pt, along with reasons to return to the ED. PLAN:  1. Discharge Medication List as of 2/23/2018  8:49 AM      START taking these medications    Details   ondansetron (ZOFRAN ODT) 4 mg disintegrating tablet Take 1 Tab by mouth every eight (8) hours as needed for Nausea for up to 15 doses. , Print, Disp-15 Tab, R-0         CONTINUE these medications which have NOT CHANGED    Details   amoxicillin-clavulanate (AUGMENTIN) 875-125 mg per tablet Take 1 Tab by mouth two (2) times a day., Normal, Disp-14 Tab, R-0      MECLIZINE HCL (MECLIZINE PO) Take  by mouth as needed., Historical Med      promethazine (PHENERGAN) 25 mg tablet Take 25 mg by mouth every six (6) hours as needed for Nausea., Historical Med      ibuprofen (MOTRIN) 200 mg tablet Take 800 mg by mouth as needed for Pain., Historical Med      metFORMIN (GLUCOPHAGE) 1,000 mg tablet Take 1,000 mg by mouth two (2) times daily (with meals). , Historical Med      BIEST / PROGESTERONE Apply 1 Tab to affected area daily. BiEst 1.5 mg / Progesterone 300 mg, Historical Med      levothyroxine (LEVOTHROID) 75 mcg tablet Take 75 mcg by mouth Daily (before breakfast). , Historical Med magnesium oxide (MAG-OX) 400 mg tablet Take 400 mg by mouth daily. , Historical Med      cholecalciferol, vitamin D3, (VITAMIN D3) 2,000 unit tab Take 1 Tab by mouth daily. , Historical Med      ALPRAZolam (XANAX) 0.5 mg tablet Take 0.5 mg by mouth as needed for Anxiety. , Historical Med      TESTOSTERONE CREAM Apply  to affected area. Testosterone Cream [facility]ER[facility] CREAM [facility][id number] Historical Med      thyroid, Pork, (ARMOUR THYROID) 60 mg tablet Take 60 mg by mouth daily. Indications: HYPOTHYROIDISM, Historical Med      HYDROCODONE/IBUPROFEN (VICOPROFEN PO) Take 1 Tab by mouth as needed., Historical Med           2. Follow-up Information     Follow up With Details Comments [address] [place] [address], [address] [place] [address]  [phone]      \[facility]\"" EMERGENCY DEPT  If symptoms worsen [address] [facility] [address]   [address] [address]  [phone]        Return to ED if worse     Diagnosis     Clinical Impression:   1. Need for rabies vaccination    2. Visit for wound check    3. Chronic nausea        Attestations: This note is prepared by Annabel Shelton, acting as Scribe for [employer]. The scribe's documentation has been prepared under my direction and personally reviewed by me in its entirety. I confirm that the note above accurately reflects all work, treatment, procedures, and medical decision making performed by me.   VEENA Wilson

## 2018-03-02 ENCOUNTER — HOSPITAL ENCOUNTER (EMERGENCY)
Age: 58
Discharge: HOME | End: 2018-03-02
Attending: EMERGENCY MEDICINE | Admitting: EMERGENCY MEDICINE
Payer: MEDICARE

## 2018-03-02 VITALS
HEART RATE: 59 BPM | WEIGHT: 163.36 LBS | RESPIRATION RATE: 16 BRPM | TEMPERATURE: 97.6 F | BODY MASS INDEX: 27.89 KG/M2 | DIASTOLIC BLOOD PRESSURE: 68 MMHG | HEIGHT: 64 IN | SYSTOLIC BLOOD PRESSURE: 100 MMHG | OXYGEN SATURATION: 100 %

## 2018-03-02 DIAGNOSIS — Z87.39 HISTORY OF FIBROMYALGIA: ICD-10-CM
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
## 2018-03-02 DIAGNOSIS — Z23 NEED FOR RABIES VACCINATION: Primary | ICD-10-CM

## 2018-03-02 PROCEDURE — 90471 IMMUNIZATION ADMIN: CPT

## 2018-03-02 PROCEDURE — 90675 RABIES VACCINE IM: CPT | Performed by: PHYSICIAN ASSISTANT

## 2018-03-02 PROCEDURE — 74011250636 HC RX REV CODE- 250/636: Performed by: PHYSICIAN ASSISTANT

## 2018-03-02 PROCEDURE — 99283 EMERGENCY DEPT VISIT LOW MDM: CPT

## 2018-03-02 RX ADMIN — RABIES VACCINE 2.5 UNITS: KIT at 09:56

## 2018-03-02 NOTE — ED NOTES
MARIA D Snell  reviewed discharge instructions with the patient. The patient verbalized understanding.

## 2018-03-02 NOTE — ED PROVIDER NOTES
EMERGENCY DEPARTMENT HISTORY AND PHYSICAL EXAM      Date: 3/2/2018  Patient Name: Albert Skinner    History of Presenting Illness     Chief Complaint   Patient presents with    Immunization/Injection     last of rabies shot today       History Provided By: Patient    HPI: Albert Skinner, 65 y.o. female with PMHx significant for DM, SVT, arrhythmia, HTN, GERD, arthritis, anxiety, DDD, presents ambulatory to the ED with cc of of her final rabies vaccination today. Pt notes she was bitten by a stray cat in the neighborhood. She states the cat had rolled onto its back and she did not see the animal, when it had bit her on her R arm. She had sustained 2 puncture wounds and 3 claw marks during this event. She denies fever, chills, pus drainage, streaking. PCP: Deven Scruggs MD    There are no other complaints, changes, or physical findings at this time. Past History     Past Medical History:  Past Medical History:   Diagnosis Date    Anxiety     Arrhythmia     Arthritis     Bulging discs     in neck    Cataracts, bilateral     DDD (degenerative disc disease)     Diabetes ([address]     Endocrine disease     hypothyroid    Fainting     Gastroparesis     GERD (gastroesophageal reflux disease)     Hypertension     not treated with medication    Migraines     Nausea & vomiting     Other ill-defined conditions(799.89)     Kidney stones    Other ill-defined conditions(799.89)     fibromyalgia    SVT (supraventricular tachycardia) ([address]     Thyroid disease     Vasovagal syncope        Past Surgical History:  Past Surgical History:   Procedure Laterality Date    BREAST SURGERY PROCEDURE UNLISTED      breast reduction    CARDIAC SURG PROCEDURE UNLIST  2006    ablation    HX CHOLECYSTECTOMY      HX HYSTERECTOMY      HX SVT ABLATION  2006    Smith    HX TUBAL LIGATION      HX UROLOGICAL      kidney stone removed.     HX UROLOGICAL      urethral surgery  x2       Family History:  Family History Problem Relation Age of Onset   [facility] Cancer Mother      ovarian    Cancer Father      lung    Other Father      hepatitis b    Heart Disease Father     Lung Disease Father     Stroke Father     Thyroid Disease Sister      hypothyroid    Hypertension Sister     Thyroid Disease Sister      hypothyroid    Other Sister      benign brain tumor    Anesth Problems Neg Hx        Social History:  Social History   Substance Use Topics    Smoking status: Former Smoker     Years: 4.00     Quit date: 2/17/1984    Smokeless tobacco: Never Used    Alcohol use Yes      Comment: wine rare       Allergies: Allergies   Allergen Reactions    Sulfa (Sulfonamide Antibiotics) Rash    Tape [Adhesive] Rash     Review of Systems   Review of Systems   Constitutional: Negative. Negative for activity change, appetite change, chills, diaphoresis, fever and unexpected weight change. HENT: Negative for congestion, hearing loss, rhinorrhea, sinus pressure, sneezing, sore throat and trouble swallowing. Eyes: Negative for pain, redness, itching and visual disturbance. Respiratory: Negative for cough, shortness of breath and wheezing. Cardiovascular: Negative for chest pain, palpitations and leg swelling. Gastrointestinal: Negative for abdominal pain, constipation, diarrhea, nausea and vomiting. Genitourinary: Negative for dysuria. Musculoskeletal: Negative for arthralgias, gait problem and myalgias. Skin: Negative for color change, pallor, rash and wound. Neurological: Negative for tremors, weakness, light-headedness, numbness and headaches. All other systems reviewed and are negative. Physical Exam   Physical Exam   Constitutional: She is oriented to person, place, and time. Vital signs are normal. She appears well-developed and well-nourished. No distress. 65 y.o.   female in NAD  Communicates appropriately and in full sentences  Normal vital signs other than a slightly bradycardic   HENT:   Head: Normocephalic and atraumatic. Eyes: Conjunctivae are normal. Pupils are equal, round, and reactive to light. Right eye exhibits no discharge. Left eye exhibits no discharge. Neck: Normal range of motion. Neck supple. No nuchal rigidity   Cardiovascular: Normal rate, regular rhythm and intact distal pulses. Pulmonary/Chest: Effort normal and breath sounds normal. No respiratory distress. She has no wheezes. Abdominal: Soft. Bowel sounds are normal. She exhibits no distension. There is no tenderness. Musculoskeletal: Normal range of motion. She exhibits no edema, tenderness or deformity. No neurologic, motor, vascular, or compartment embarrassment observed on exam. No focal neurologic deficits. Neurological: She is alert and oriented to person, place, and time. Coordination normal.   Skin: Skin is warm and dry. No rash noted. She is not diaphoretic. No erythema. No pallor. Well healing puncture wound to palmar aspect of R forearm, small amount of ecchymosis, no erythema, streaking, induration, or fluctuance   Psychiatric: She has a normal mood and affect. Nursing note and vitals reviewed. Medical Decision Making   I am the first provider for this patient. I reviewed the vital signs, available nursing notes, past medical history, past surgical history, family history and social history. Vital Signs-Reviewed the patient's vital signs. Patient Vitals for the past 12 hrs:   Temp Pulse Resp BP SpO2   03/02/18 0938 97.6 °F (36.4 °C) (!) 59 16 100/68 100 %     Records Reviewed: Nursing Notes and Old Medical Records    Provider Notes (Medical Decision Making):   Continue rabies post exposure prophylaxis series. Dose #1 was 2/16/18  Dose #2 of #4 s was 2/19/18  Dose #3 of #4 is was 2/23/18  Dose #4 of #4 s was TODAY    A four dose intramuscular rabies immunization on days 0, 3, 7 and 14 is recommended for those who receive wound care plus high-quality RIG plus rabies vaccine.  Pt is here to receive her final rabies vaccine. ED Course:   Initial assessment performed. The patients presenting problems have been discussed, and they are in agreement with the care plan formulated and outlined with them. I have encouraged them to ask questions as they arise throughout their visit. Disposition:  DISCHARGE NOTE  10:00 AM  The patient has been re-evaluated and is ready for discharge. Reviewed available results with patient. Counseled patient on diagnosis and care plan. Patient has expressed understanding, and all questions have been answered. Patient agrees with plan and agrees to follow up as recommended, or return to the ED if their symptoms worsen. Discharge instructions have been provided and explained to the patient, along with reasons to return to the ED. PLAN:  1. Discharge  Follow-up Information     Follow up With Details Comments Contact Info    Damián Cuellar MD Schedule an appointment as soon as possible for a visit in 2 days As needed, If symptoms worsen, Possible further evaluation and treatment [address]  [address] [facility]  [phone]      [facility] EMERGENCY DEPT Go to As needed, If symptoms worsen [address] [place] [address]  [phone]        Return to ED if worse     Diagnosis     Clinical Impression:   1. Need for rabies vaccination    2. Cat bite, subsequent encounter    3. History of fibromyalgia        Attestations: This note is prepared by Jason Garcia, acting as Scribe for [employer]. [employer]: The scribe's documentation has been prepared under my direction and personally reviewed by me in its entirety. I confirm that the note above accurately reflects all work, treatment, procedures, and medical decision making performed by me. This note will not be viewable in [address]

## 2018-03-02 NOTE — DISCHARGE INSTRUCTIONS
Animal Bites: Care Instructions  Your Care Instructions  After an animal bite, the biggest concern is infection. The chance of infection depends on the type of animal that bit you, where on your body you were bitten, and your general health. Many animal bites are not closed with stitches, because this can increase the chance of infection. Your bite may take as little as 7 days or as long as several months to heal, depending on how bad it is. Taking good care of your wound at home will help it heal and reduce your chance of infection. The doctor has checked you carefully, but problems can develop later. If you notice any problems or new symptoms, get medical treatment right away. Follow-up care is a key part of your treatment and safety. Be sure to make and go to all appointments, and call your doctor if you are having problems. It's also a good idea to know your test results and keep a list of the medicines you take. How can you care for yourself at home? · If your doctor told you how to care for your wound, follow your doctor's instructions. If you did not get instructions, follow this general advice:  ¨ After 24 to 48 hours, gently wash the wound with clean water 2 times a day. Do not scrub or soak the wound. Don't use hydrogen peroxide or alcohol, which can slow healing. ¨ You may cover the wound with a thin layer of petroleum jelly, such as Vaseline, and a nonstick bandage. ¨ Apply more petroleum jelly and replace the bandage as needed. · After you shower, gently dry the wound with a clean towel. · If your doctor has closed the wound, cover the bandage with a plastic bag before you take a shower. · A small amount of skin redness and swelling around the wound edges and the stitches or staples is normal. Your wound may itch or feel irritated. Do not scratch or rub the wound.   · Ask your doctor if you can take an over-the-counter pain medicine, such as acetaminophen (Tylenol), ibuprofen (Advil, Motrin), or naproxen (Aleve). Read and follow all instructions on the label. · Do not take two or more pain medicines at the same time unless the doctor told you to. Many pain medicines have acetaminophen, which is Tylenol. Too much acetaminophen (Tylenol) can be harmful. · If your bite puts you at risk for rabies, you will get a series of shots over the next few weeks to prevent rabies. Your doctor will tell you when to get the shots. It is very important that you get the full cycle of shots. Follow your doctor's instructions exactly. · You may need a tetanus shot if you have not received one in the last 5 years. · If your doctor prescribed antibiotics, take them as directed. Do not stop taking them just because you feel better. You need to take the full course of antibiotics. When should you call for help? Call your doctor now or seek immediate medical care if:  ? · The skin near the bite turns cold or pale or it changes color. ? · You lose feeling in the area near the bite, or it feels numb or tingly. ? · You have trouble moving a limb near the bite. ? · You have symptoms of infection, such as:  ¨ Increased pain, swelling, warmth, or redness near the wound. ¨ Red streaks leading from the wound. ¨ Pus draining from the wound. ¨ A fever. ? · Blood soaks through the bandage. Oozing small amounts of blood is normal.   ? · Your pain is getting worse. ? Watch closely for changes in your health, and be sure to contact your doctor if you are not getting better as expected. Where can you learn more? Go to http://blayne-wali.info/. Enter B912 in the search box to learn more about \"Animal Bites: Care Instructions. \"  Current as of: March 20, 2017  Content Version: 11.4  © 5282-2996 [employer]. Care instructions adapted under license by [employer] (which disclaims liability or warranty for this information).  If you have questions about a medical condition or this instruction, always ask your healthcare professional. [address] any warranty or liability for your use of this information. Rabies Vaccine (By injection)   Rabies Vaccine (RAY-beedae VAX-een)  Prevents infection caused by rabies virus. The vaccine can be given before or after you are exposed to the rabies virus. Brand Name(s): Imovax Rabies, RabAvert   There may be other brand names for this medicine. When This Medicine Should Not Be Used: You should not receive a rabies vaccine if you have had an allergic reaction to it before. How to Use This Medicine:   Injectable  · Your doctor will prescribe your exact dose and tell you how often it should be given. This medicine is given as a shot into one of your muscles. The vaccine is injected into the upper arm muscle. Very young or small children may have the vaccine injected into the upper leg muscle. · A nurse or other health provider will give you this medicine. · You are at risk for exposure to the rabies virus if you work with animals or will be going to a country where rabies is common. People who are at risk of being exposed to rabies will receive 3 doses on 3 different days within a 1-month period. · If you have received the vaccine in the past and have been exposed to the rabies virus, you will need to receive 2 doses on 2 different days within a 1-month period. · If you have not yet received the vaccine and were exposed to the rabies virus, you will need a total of 5 doses on 5 different days within a 1-month period. You will also receive a shot of rabies immune globulin. · It is very important that you have the shots on the exact day your doctor tells you to. If a dose is missed:   · You must use this medicine on a fixed schedule. Call your doctor or pharmacist if you miss a dose.   Drugs and Foods to Avoid:   Ask your doctor or pharmacist before using any other medicine, including over-the-counter medicines, vitamins, and herbal products. · Tell your doctor before you receive this vaccine if you take medicine that weakens your immune system, such as a steroid (such as dexamethasone, hydrocortisone, methylprednisolone, prednisolone, prednisone, Medrol®) or cancer treatment. Warnings While Using This Medicine:   · Make sure your doctor knows if you are pregnant or breastfeeding. Tell your doctor if you have had an allergic reaction to any type of vaccine, if you have an illness with fever, or if you have an immune system problem. · This medicine is made from donated human blood. Some human blood products have transmitted viruses to people who have received them, although the risk is low. Human donors and donated blood are both tested for viruses to keep the transmission risk low. Talk with your médico about this risk if you are concerned. · Your doctor will do lab tests at regular visits to check on the effects of this medicine. Keep all appointments. Possible Side Effects While Using This Medicine:   Call your doctor right away if you notice any of these side effects:  · Allergic reaction: Itching or hives, swelling in your face or hands, swelling or tingling in your mouth or throat, chest tightness, trouble breathing  · Muscle pain, stiffness, or weakness  · Numbness, tingling, or burning pain in your hands, arms, legs, or feet  If you notice these less serious side effects, talk with your doctor:   · Dizziness, headache  · Fever  · Itching, pain, redness, or swelling where the shot was given  · Nausea, stomach pain  · Tiredness  If you notice other side effects that you think are caused by this medicine, tell your doctor. Call your doctor for medical advice about side effects. You may report side effects to FDA at 1-388-FDA-2521  © 2017 [address] Information is for End User's use only and may not be sold, redistributed or otherwise used for commercial purposes.   The above information is an educational aid only. It is not intended as medical advice for individual conditions or treatments. Talk to your doctor, nurse or pharmacist before following any medical regimen to see if it is safe and effective for you.

## 2018-03-06 ENCOUNTER — OFFICE VISIT (OUTPATIENT)
Dept: ENDOCRINOLOGY | Age: 58
End: 2018-03-06

## 2018-03-06 VITALS
DIASTOLIC BLOOD PRESSURE: 75 MMHG | OXYGEN SATURATION: 98 % | BODY MASS INDEX: 27.66 KG/M2 | HEIGHT: 64 IN | WEIGHT: 162 LBS | SYSTOLIC BLOOD PRESSURE: 99 MMHG | RESPIRATION RATE: 16 BRPM | HEART RATE: 78 BPM

## 2018-03-06 DIAGNOSIS — Z86.2 H/O: IRON DEFICIENCY ANEMIA: ICD-10-CM

## 2018-03-06 DIAGNOSIS — E06.3 HYPOTHYROIDISM DUE TO HASHIMOTO'S THYROIDITIS: Primary | ICD-10-CM

## 2018-03-06 DIAGNOSIS — E11.9 CONTROLLED TYPE 2 DIABETES MELLITUS WITHOUT COMPLICATION, WITHOUT LONG-TERM CURRENT USE OF INSULIN (HCC): ICD-10-CM

## 2018-03-06 DIAGNOSIS — E03.8 HYPOTHYROIDISM DUE TO HASHIMOTO'S THYROIDITIS: Primary | ICD-10-CM

## 2018-03-06 RX ORDER — LEVOTHYROXINE SODIUM 88 UG/1
TABLET ORAL
Refills: 5 | COMMUNITY
Start: 2018-02-22 | End: 2018-03-06 | Stop reason: ALTCHOICE

## 2018-03-06 RX ORDER — LIOTHYRONINE SODIUM 5 UG/1
10 TABLET ORAL DAILY
Qty: 60 TAB | Refills: 5 | Status: SHIPPED | OUTPATIENT
Start: 2018-03-06 | End: 2018-08-22 | Stop reason: SDUPTHER

## 2018-03-06 RX ORDER — BUTALBITAL, ACETAMINOPHEN AND CAFFEINE 50; 325; 40 MG/1; MG/1; MG/1
TABLET ORAL
Refills: 1 | COMMUNITY
Start: 2018-01-19

## 2018-03-06 RX ORDER — LEVOTHYROXINE SODIUM 88 UG/1
88 CAPSULE ORAL DAILY
Qty: 30 CAP | Refills: 5 | Status: SHIPPED | OUTPATIENT
Start: 2018-03-06 | End: 2018-05-25 | Stop reason: ALTCHOICE

## 2018-03-06 RX ORDER — METHENAMINE, SODIUM PHOSPHATE, MONOBASIC, MONOHYDRATE, PHENYL SALICYLATE, METHYLENE BLUE, AND HYOSCYAMINE SULFATE 118; 40.8; 36; 10; .12 MG/1; MG/1; MG/1; MG/1; MG/1
CAPSULE ORAL
Refills: 6 | COMMUNITY
Start: 2018-02-20

## 2018-03-06 RX ORDER — CYANOCOBALAMIN 1000 UG/ML
INJECTION, SOLUTION INTRAMUSCULAR; SUBCUTANEOUS
Refills: 5 | COMMUNITY
Start: 2018-02-23

## 2018-03-06 RX ORDER — AZITHROMYCIN 250 MG/1
TABLET, FILM COATED ORAL
Refills: 0 | COMMUNITY
Start: 2018-01-08 | End: 2018-03-06 | Stop reason: ALTCHOICE

## 2018-03-06 RX ORDER — FUROSEMIDE 20 MG/1
TABLET ORAL
Refills: 0 | COMMUNITY
Start: 2018-02-06

## 2018-03-06 RX ORDER — METFORMIN HYDROCHLORIDE 500 MG/1
TABLET ORAL
Refills: 1 | COMMUNITY
Start: 2018-01-14

## 2018-03-06 RX ORDER — CARISOPRODOL 350 MG/1
TABLET ORAL
Refills: 0 | COMMUNITY
Start: 2018-01-15

## 2018-03-06 RX ORDER — THYROID, PORCINE 30 MG/1
TABLET ORAL
Refills: 1 | COMMUNITY
Start: 2018-01-22 | End: 2018-03-06 | Stop reason: ALTCHOICE

## 2018-03-06 RX ORDER — TRAMADOL HYDROCHLORIDE 50 MG/1
TABLET ORAL
Refills: 2 | COMMUNITY
Start: 2018-02-06

## 2018-03-06 RX ORDER — INSULIN PUMP SYRINGE, 3 ML
EACH MISCELLANEOUS
Qty: 1 KIT | Refills: 0 | Status: SHIPPED | OUTPATIENT
Start: 2018-03-06

## 2018-03-06 RX ORDER — FLUCONAZOLE 150 MG/1
TABLET ORAL
Refills: 2 | COMMUNITY
Start: 2018-01-19 | End: 2018-03-06

## 2018-03-06 NOTE — PROGRESS NOTES
Endocrinology Visit    CC: hypothyroidism    Referring provider: Daniel Ghosh MD     History of present illness:  Nam Barrow is a 65 y.o. female here for hypothyroidism. She has a strong family history of hypothyroidism and was diagnosed in her early 25s. She reports an episode of hyperthyroidism and goiter during her first pregnancy, later started thyroid hormone replacement after her second pregnancy. She was on Synthroid for years but wanted to start natural thyroid hormone replacement about 15 years ago. She was later switched back to Synthroid by another endocrinologist, but says she cannot take generic levothyroxine. Recently, her PCP has had her on a combination of Synthroid and [place] thyroid. TSH levels have been very low - labs in July 2017 showed TSH of 0.089 with FT4 of 1.83. She was advised to decrease Synthroid to 75 mcg daily. She did not feel well on this dose so has continued to fill her 88 mcg pill instead. Last labs in Oct showed TSH of 0.04, FT4 of 1.57, and total T3 of 107. She  currently takes Synthroid 88 mcg daily and [place] 30mg daily. She takes these together on an empty stomach, first thing in the morning. She does not eat breakfast but drinks coffee with her thyroid medication. She takes her vitamins with her thyroid medication - these do not contain calcium or iron. Reports chronic cold intolerance which is worse when she is on less thyroid hormone. Also c/o hair loss, worse since menopause. She denies racing heart, tremor, palpitations, weight changes, or energy changes. Does have chronic constipation which is controlled with magnesium. She prefers to keep her diet and medications as natural as possible. Does not have celiac disease but follows a gluten free diet. She does have a h/o type 2 diabetes which is now controlled with metformin alone, last A1c was 5.3% in July. She asks for a new meter because her brand is no longer covered by her insurance.  She is on disability due to multiple health issues. ROS: see HPI for pertinent positives and negatives, otherwise a 10 system review is negative    Problem list:  Patient Active Problem List   Diagnosis Code    Syncope and collapse R55    Type II or unspecified type diabetes mellitus without mention of complication, not stated as uncontrolled E11.9    Hypothyroid E03.9    Angina pectoris ([address] I20.9    Pre-operative cardiovascular examination Z01.810       Medical history:  Past Medical History:   Diagnosis Date    Anxiety     Arrhythmia     Arthritis     Bulging discs     in neck    Cataracts, bilateral     DDD (degenerative disc disease)     Diabetes ([address]     Endocrine disease     hypothyroid    Fainting     Gastroparesis     GERD (gastroesophageal reflux disease)     Hypertension     not treated with medication    Migraines     Nausea & vomiting     Other ill-defined conditions(799.89)     Kidney stones    Other ill-defined conditions(799.89)     fibromyalgia    SVT (supraventricular tachycardia) ([address]     Thyroid disease     Vasovagal syncope        Past surgical history:  Past Surgical History:   Procedure Laterality Date    BREAST SURGERY PROCEDURE UNLISTED      breast reduction    CARDIAC SURG PROCEDURE UNLIST  2006    ablation    HX CHOLECYSTECTOMY      HX HYSTERECTOMY      HX SVT ABLATION  2006    Smith    HX TUBAL LIGATION      HX UROLOGICAL      kidney stone removed.     HX UROLOGICAL      urethral surgery  x2       Medications:    Current Outpatient Prescriptions:     URIBEL 118-10-40.8-36 mg cap capsule, TAKE ONE CAPSULE BY MOUTH TWICE DAILY AS NEEDED, Disp: , Rfl: 6    furosemide (LASIX) 20 mg tablet, TAKE 1 TABLET BY MOUTH EVERY DAY AS NEEDED FOR EDEMA, Disp: , Rfl: 0    butalbital-acetaminophen-caffeine (FIORICET, ESGIC) -40 mg per tablet, TAKE ONE TABLET BY MOUTH EVERY FOUR HOURS AS NEEDED FOR HEADACHE, Disp: , Rfl: 1    traMADol (ULTRAM) 50 mg tablet, TAKE 1 TABLET BY MOUTH EVERY 6 HOURS AS NEEDED FOR PAIN FOR 30 DAYS, Disp: , Rfl: 2    cyanocobalamin (VITAMIN B12) 1,000 mcg/mL injection, INJECT 1 ML ONCE A MONTH, Disp: , Rfl: 5    SYNTHROID 88 mcg tablet, TAKE 1 TABLET BY MOUTH EVERY DAY IN THE MORNING ON AN EMPTY STOMACH, Disp: , Rfl: 5    ARMOUR THYROID 30 mg tablet, TAKE 1 TABLET BY MOUTH EVERY DAY, Disp: , Rfl: 1    metFORMIN (GLUCOPHAGE) 500 mg tablet, TAKE 1 TABLET BY MOUTH TWICE A DAY WITH MEALS, Disp: , Rfl: 1    carisoprodol (SOMA) 350 mg tablet, TAKE 1 TABLET BY MOUTH 3 TIMES A DAY AS NEEDED, Disp: , Rfl: 0    OTHER, , Disp: , Rfl:     NALTREXONE HCL (NALTREXONE PO), Take  by mouth., Disp: , Rfl:     OTHER, , Disp: , Rfl:     SELENOMETHIONINE PO, Take  by mouth., Disp: , Rfl:     ondansetron (ZOFRAN ODT) 4 mg disintegrating tablet, Take 1 Tab by mouth every eight (8) hours as needed for Nausea for up to 15 doses. , Disp: 15 Tab, Rfl: 0    MECLIZINE HCL (MECLIZINE PO), Take  by mouth as needed. , Disp: , Rfl:     promethazine (PHENERGAN) 25 mg tablet, Take 25 mg by mouth every six (6) hours as needed for Nausea., Disp: , Rfl:     BIEST / PROGESTERONE, Apply 1 Tab to affected area daily. BiEst 1.5 mg / Progesterone 300 mg, Disp: , Rfl:     magnesium oxide (MAG-OX) 400 mg tablet, Take 400 mg by mouth daily. , Disp: , Rfl:     cholecalciferol, vitamin D3, (VITAMIN D3) 2,000 unit tab, Take 1 Tab by mouth daily. , Disp: , Rfl:     ALPRAZolam (XANAX) 0.5 mg tablet, Take 0.5 mg by mouth as needed for Anxiety. , Disp: , Rfl:     Allergies: Allergies   Allergen Reactions    Sulfa (Sulfonamide Antibiotics) Rash    Tape [Adhesive] Rash       Social History:  Social History     Social History    Marital status:      Spouse name: N/A    Number of children: N/A    Years of education: N/A     Occupational History    Not on file.      Social History Main Topics    Smoking status: Former Smoker     Years: 4.00     Quit date: 2/17/1984    Smokeless tobacco: Never Used   [address] [place] [address] Alcohol use No      Comment: wine rare    Drug use: No    Sexual activity: Not on file     Other Topics Concern    Not on file     Social History Narrative       Family History:  Family History   Problem Relation Age of Onset   David Rodriguez Cancer Mother      ovarian    Cancer Father      lung    Other Father      hepatitis b    Heart Disease Father     Lung Disease Father     Stroke Father     Thyroid Disease Sister      hypothyroid    Hypertension Sister     Thyroid Disease Sister      hypothyroid    Other Sister      benign brain tumor    Anesth Problems Neg Hx        Physical Exam:  Visit Vitals    BP 99/75    Pulse 78    Resp 16    Ht 5' 4\" (1.626 m)    Wt 162 lb (73.5 kg)    SpO2 98%    BMI 27.81 kg/m2     Gen: NAD  Heent: mucous membranes moist, no lid lag, stare or exophthalmos. No scleral or conjunctival injection. Extra ocular muscles intact. Thyroid: moves well with swallowing, normal size, normal texture, no masses appreciated  Heme/lymph: no cervical, supraclavicular or submandibular lymphadenopathy is appreciated. Pulmonary: clear to auscultation bilaterally, no wheezes/rhonchi or rales  Cardiovascular: regular rate and rhythm, no murmurs, rubs or gallops, good distal pulses  Extremities: no clubbing, cyanosis or edema. No onocholysis   Neuro: fine tremor of outstretched hands, reflexes are normal with normal relaxation phase. Normal gait, normal concentration  Skin: normal texture, warm and dry   Psyche: normal affect with good insight into her medical conditions    Pertinent lab review: There are no results available in [facility]. Pertinent lab results from outside records are transcribed in HPI and scanned into the medical record. Assessment and plan:  Darlene Huang is a 65 y.o. female here for hypothyroidism.  She clinically appears euthyroid by exam, however last labs show iatrogenic hyperthyroidism on current thyroid replacement regimen of Synthroid 88 mcg daily and [place] 30 mcg daily. We discussed that natural thyroid preparations can be problematic since the ratio of T3/T4 is not always consistent and may be more T3 heavy. Since she has a history of poor T4 to T3 conversion, I would prefer to do combination LT4 and LT3 (Synthroid and Cytomel) which will afford more control over the ratio. I recommend she d/c [place] thyroid and start Cytomel 10 mcg daily. Continue Synthroid 88 mcg daily. This will achieve an overall T4 dose reduction but maintain the T3 she is receiving. We reviewed the correct way to take these medications - she will start taking them by themselves first thing in the morning and wait 30 minutes before drinking coffee or taking her other medications/supplements. To further assess baseline thyroid hormone economy, we will check a TSH, Free T4, and Free T3 today. Plan to repeat these in 2 months to see the effect of her new doses. I spent 45 minutes with the patient today and > 50% of the time was spent counseling the patient about hypothyroidism: its etiology, clinical manifestations, diagnosis, and management. She will return in 4 months time. Thank you for the opportunity to partake in this patients care.   Anju Montano MD  [facility]  [facility]

## 2018-03-06 NOTE — MR AVS SNAPSHOT
[address] 
[phone] Patient: Kelby Duarte MRN: [id number] :1960 Visit Information Date & Time Provider Department Dept. Phone Encounter #  
 3/6/2018 11:10 AM MD [facility] [id number] Upcoming Health Maintenance Date Due Hepatitis C Screening 1960 HEMOGLOBIN A1C Q6M 1961 LIPID PANEL Q1 1961 FOOT EXAM Q1 1971 MICROALBUMIN Q1 1971 EYE EXAM RETINAL OR DILATED Q1 1971 Pneumococcal 19-64 Medium Risk (1 of 1 - PPSV23) 1980 DTaP/Tdap/Td series (1 - Tdap) 1982 PAP AKA CERVICAL CYTOLOGY 1982 BREAST CANCER SCRN MAMMOGRAM 2011 FOBT Q 1 YEAR AGE 50-75 2011 Influenza Age 5 to Adult 2017 Allergies as of 3/6/2018  Review Complete On: 3/2/2018 By: Tasia Wilkerson RN Severity Noted Reaction Type Reactions Sulfa (Sulfonamide Antibiotics)  2012    Rash Tape [Adhesive]  2014    Rash Current Immunizations  Never Reviewed Name Date Rabies Immune Globulin 2018  2:22 PM  
 Rabies- IM Fibroblast Culture 3/2/2018  9:56 AM, 2018  8:44 AM, 2018  6:54 AM, 2018  2:22 PM  
  
 Not reviewed this visit You Were Diagnosed With   
  
 Codes Comments Hypothyroidism due to Hashimoto's thyroiditis    -  Primary ICD-10-CM: E03.8, E06.3 ICD-9-CM: 244.8, 245.2 Controlled type 2 diabetes mellitus without complication, without long-term current use of insulin ([facility]     ICD-10-CM: E11.9 ICD-9-CM: 250.00 Vitals BP Pulse Resp Height(growth percentile) Weight(growth percentile) SpO2  
  78 16 5' 4\" (1.626 m) 162 lb (73.5 kg) 98% BMI OB Status Smoking Status 27.81 kg/m2 Hysterectomy Former Smoker Vitals History BMI and BSA Data Body Mass Index Body Surface Area 27.81 kg/m 2 1.82 m 2 Preferred Pharmacy Pharmacy Name Phone [facility] [address] [facility] [place] [phone] Your Updated Medication List  
  
   
This list is accurate as of 3/6/18 11:58 AM.  Always use your most recent med list.  
  
  
  
  
 Opal Mayfield / Chidi Patrick Apply 1 Tab to affected area daily. BiEst 1.5 mg / Progesterone 300 mg Blood-Glucose Meter monitoring kit Check BG 1-2 times/day; please provide brand covered by pt's insurance  
  
 butalbital-acetaminophen-caffeine -40 mg per tablet Commonly known as:  FIORICET, ESGIC  
TAKE ONE TABLET BY MOUTH EVERY FOUR HOURS AS NEEDED FOR HEADACHE  
  
 carisoprodol 350 mg tablet Commonly known as:  SOMA TAKE 1 TABLET BY MOUTH 3 TIMES A DAY AS NEEDED  
  
 cyanocobalamin 1,000 mcg/mL injection Commonly known as:  VITAMIN B12 INJECT 1 ML ONCE A MONTH  
  
 furosemide 20 mg tablet Commonly known as:  LASIX TAKE 1 TABLET BY MOUTH EVERY DAY AS NEEDED FOR EDEMA  
  
 glucose blood VI test strips strip Commonly known as:  blood glucose test  
Check BG 1-2 times/day; please provide brand covered by pt's insurance  
  
 liothyronine 5 mcg tablet Commonly known as:  CYTOMEL Take 2 Tabs by mouth daily. magnesium oxide 400 mg tablet Commonly known as:  MAG-OX Take 400 mg by mouth daily. MECLIZINE PO Take  by mouth as needed. metFORMIN 500 mg tablet Commonly known as:  GLUCOPHAGE  
TAKE 1 TABLET BY MOUTH TWICE A DAY WITH MEALS  
  
 NALTREXONE PO Take  by mouth. ondansetron 4 mg disintegrating tablet Commonly known as:  ZOFRAN ODT Take 1 Tab by mouth every eight (8) hours as needed for Nausea for up to 15 doses. OTHER  
  
 OTHER  
  
 promethazine 25 mg tablet Commonly known as:  PHENERGAN Take 25 mg by mouth every six (6) hours as needed for Nausea. SELENOMETHIONINE PO Take  by mouth.   
  
 TESTOSTERONE CREAM  
 Apply  to affected area. Testosterone Cream {TESTOSTERONE CREAM LIST:[id number]} TIROSINT 88 mcg Cap Generic drug:  Levothyroxine Take 88 mcg by mouth daily. traMADol 50 mg tablet Commonly known as:  ULTRAM  
TAKE 1 TABLET BY MOUTH EVERY 6 HOURS AS NEEDED FOR PAIN FOR 30 DAYS  
  
 URIBEL 118-10-40.8-36 mg Cap capsule Generic drug:  Mth-Me Blue-Sod Phos-PhSal-Hyo  
TAKE ONE CAPSULE BY MOUTH TWICE DAILY AS NEEDED  
  
 VITAMIN D3 2,000 unit Tab Generic drug:  cholecalciferol (vitamin D3) Take 1 Tab by mouth daily. XANAX 0.5 mg tablet Generic drug:  ALPRAZolam  
Take 0.5 mg by mouth as needed for Anxiety. Prescriptions Sent to Pharmacy Refills Blood-Glucose Meter monitoring kit 0 Sig: Check BG 1-2 times/day; please provide brand covered by pt's insurance Class: Normal  
 Pharmacy: [facility] [id number] [place] [facility] Ph #: [phone] TIROSINT 88 mcg cap 5 Sig: Take 88 mcg by mouth daily. Class: Normal  
 Pharmacy: [facility] [id number] [place] [facility] Ph #: [phone] Route: Oral  
 liothyronine (CYTOMEL) 5 mcg tablet 5 Sig: Take 2 Tabs by mouth daily. Class: Normal  
 Pharmacy: [facility] [address] [place] [facility] Ph #: [phone] Route: Oral  
 glucose blood VI test strips (BLOOD GLUCOSE TEST) strip 11 Sig: Check BG 1-2 times/day; please provide brand covered by pt's insurance Class: Normal  
 Pharmacy: [facility] [address] [place] [facility] Ph #: [phone] We Performed the Following T3, FREE S9392457 CPT(R)] T3, FREE [id number] CPT(R)] T3, FREE [id number] CPT(R)] THYROID ANTIBODY PANEL [00191 CPT(R)] TSH AND FREE T4 [62168 CPT(R)] TSH AND FREE T4 [34203 CPT(R)] TSH AND FREE T4 [51930 CPT(R)] Introducing [facility]!    
 Paula Dodson introduces [employer] patient portal. Now you can access parts of your medical record, email your doctor's office, and request medication refills online. 1. In your internet browser, go to [email or web address] 2. Click on the First Time User? Click Here link in the Sign In box. You will see the New Member Sign Up page. 3. Enter your [employer] Access Code exactly as it appears below. You will not need to use this code after youve completed the sign-up process. If you do not sign up before the expiration date, you must request a new code. · [employer] Access Code: [email or web address] Expires: 5/17/2018  2:22 PM 
 
4. Enter the last four digits of your Social Security Number (xxxx) and Date of Birth (mm/dd/yyyy) as indicated and click Submit. You will be taken to the next sign-up page. 5. Create a [facility]Store ID. This will be your [employer]Store login ID and cannot be changed, so think of one that is secure and easy to remember. 6. Create a [employer]Store password. You can change your password at any time. 7. Enter your Password Reset Question and Answer. This can be used at a later time if you forget your password. 8. Enter your e-mail address. You will receive e-mail notification when new information is available in [address] 9. Click Sign Up. You can now view and download portions of your medical record. 10. Click the Download Summary menu link to download a portable copy of your medical information. If you have questions, please visit the Frequently Asked Questions section of the [employer]Store website. Remember, [facility]Store is NOT to be used for urgent needs. For medical emergencies, dial 911. Now available from your iPhone and Android! Please provide this summary of care documentation to your next provider. Your primary care clinician is listed as Annie Fenton. If you have any questions after today's visit, please call [phone].

## 2018-03-07 LAB
FERRITIN SERPL-MCNC: 118 NG/ML (ref 15–150)
T3FREE SERPL-MCNC: 3.2 PG/ML (ref 2–4.4)
T4 FREE SERPL-MCNC: 1.54 NG/DL (ref 0.82–1.77)
THYROGLOB AB SERPL-ACNC: <1 IU/ML (ref 0–0.9)
THYROPEROXIDASE AB SERPL-ACNC: 13 IU/ML (ref 0–34)
TSH SERPL DL<=0.005 MIU/L-ACNC: 0.07 UIU/ML (ref 0.45–4.5)

## 2018-05-24 ENCOUNTER — PATIENT MESSAGE (OUTPATIENT)
Dept: ENDOCRINOLOGY | Age: 58
End: 2018-05-24

## 2018-05-24 LAB
T3FREE SERPL-MCNC: 2.4 PG/ML (ref 2–4.4)
T4 FREE SERPL-MCNC: 0.96 NG/DL (ref 0.82–1.77)
TSH SERPL DL<=0.005 MIU/L-ACNC: 3.34 UIU/ML (ref 0.45–4.5)

## 2018-05-25 RX ORDER — LEVOTHYROXINE SODIUM 100 UG/1
100 TABLET ORAL
Qty: 30 TAB | Refills: 2 | Status: SHIPPED | OUTPATIENT
Start: 2018-05-25 | End: 2018-08-16 | Stop reason: SDUPTHER

## 2018-05-25 NOTE — TELEPHONE ENCOUNTER
From: Risa Marino MD  To: Tavo Silva  Sent: 5/24/2018 11:06 AM EDT  Subject: results    Hi Mrs Pilar Mims,  Just wanted to see how you're feeling on the new thyroid hormone doses. Please also confirm that you're taking the Tirosint 88 mcg and Cytomel 10 mcg every day.    Thanks, Stephie Hampton

## 2018-08-25 LAB
T3FREE SERPL-MCNC: 2.1 PG/ML (ref 2–4.4)
T4 FREE SERPL-MCNC: 1.01 NG/DL (ref 0.82–1.77)
TSH SERPL DL<=0.005 MIU/L-ACNC: 4.63 UIU/ML (ref 0.45–4.5)

## 2018-08-29 ENCOUNTER — PATIENT MESSAGE (OUTPATIENT)
Dept: ENDOCRINOLOGY | Age: 58
End: 2018-08-29

## 2018-08-29 RX ORDER — LEVOTHYROXINE SODIUM 112 UG/1
112 TABLET ORAL
Qty: 30 TAB | Refills: 2 | Status: SHIPPED | OUTPATIENT
Start: 2018-08-29 | End: 2018-11-23 | Stop reason: SDUPTHER

## 2018-08-29 NOTE — TELEPHONE ENCOUNTER
From: Sulma Carrera MD  To: Helga River  Sent: 8/29/2018 9:49 AM EDT  Subject: results    Hi Mrs Watts,  Please confirm your current thyroid medication doses that you are taking for me. I know what it says on my end, but your numbers are not making sense so I just want to confirm with you. Also, are you taking these consistently every day without missing a dose?   Thanks, Andrea Carmichael

## 2018-10-22 ENCOUNTER — TELEPHONE (OUTPATIENT)
Dept: ENDOCRINOLOGY | Age: 58
End: 2018-10-22

## 2018-10-22 NOTE — TELEPHONE ENCOUNTER
Ms. Meme Sharpe appointment was cancelled today and she would like to be seen sooner. Ms. Meme Sharpe would also like a call because she might be moving and to discuss her medications with you. Thanks Loi Dent.

## 2018-11-25 RX ORDER — LIOTHYRONINE SODIUM 5 UG/1
TABLET ORAL
Qty: 60 TAB | Refills: 2 | Status: SHIPPED | OUTPATIENT
Start: 2018-11-25

## 2018-11-25 RX ORDER — LEVOTHYROXINE SODIUM 112 UG/1
112 TABLET ORAL
Qty: 30 TAB | Refills: 2 | Status: SHIPPED | OUTPATIENT
Start: 2018-11-25